# Patient Record
Sex: FEMALE | Race: BLACK OR AFRICAN AMERICAN | NOT HISPANIC OR LATINO | Employment: FULL TIME | URBAN - METROPOLITAN AREA
[De-identification: names, ages, dates, MRNs, and addresses within clinical notes are randomized per-mention and may not be internally consistent; named-entity substitution may affect disease eponyms.]

---

## 2019-08-12 ENCOUNTER — OFFICE VISIT (OUTPATIENT)
Dept: INTERNAL MEDICINE | Facility: CLINIC | Age: 39
End: 2019-08-12

## 2019-08-12 VITALS
HEART RATE: 76 BPM | WEIGHT: 137.4 LBS | RESPIRATION RATE: 18 BRPM | DIASTOLIC BLOOD PRESSURE: 64 MMHG | TEMPERATURE: 98.2 F | BODY MASS INDEX: 20.82 KG/M2 | HEIGHT: 68 IN | OXYGEN SATURATION: 99 % | SYSTOLIC BLOOD PRESSURE: 114 MMHG

## 2019-08-12 DIAGNOSIS — E03.9 HYPOTHYROIDISM, UNSPECIFIED TYPE: Primary | ICD-10-CM

## 2019-08-12 PROCEDURE — 99203 OFFICE O/P NEW LOW 30 MIN: CPT | Performed by: FAMILY MEDICINE

## 2019-08-12 RX ORDER — LEVOTHYROXINE SODIUM 50 MCG
50 TABLET ORAL DAILY
Qty: 30 TABLET | Refills: 6 | Status: SHIPPED | OUTPATIENT
Start: 2019-08-12 | End: 2019-09-03 | Stop reason: SDUPTHER

## 2019-08-12 NOTE — PROGRESS NOTES
Subjective   Sarika Zavala is a 39 y.o. female.     Chief Complaint   Patient presents with   • Thyroid Problem         History of Present Illness     Patient a new patient today's office visit.  Patient states that she has been doing over-the-counter herbal supplements and vitamins to help control her thyroid.  Patient states that she believes she has hypothyroidism, as perhaps it was checked in the past.  Patient states that she is interested in trying [place] Thyroid.  She does have signs and symptoms of hypothyroidism, which include fatigue, dry skin, and heat cold intolerance.    The following portions of the patient's history were reviewed and updated as appropriate: allergies, current medications, past family history, past medical history, past social history, past surgical history and problem list.    Review of Systems   Constitutional: Positive for fatigue. Negative for chills and fever.   HENT: Negative.  Negative for congestion, rhinorrhea, sinus pain and sore throat.    Eyes: Negative for photophobia and visual disturbance.   Respiratory: Negative.  Negative for cough, chest tightness and shortness of breath.    Cardiovascular: Negative.  Negative for chest pain and palpitations.   Gastrointestinal: Negative for diarrhea, nausea and vomiting.   Endocrine: Positive for cold intolerance.   Genitourinary: Negative for dysuria, frequency and urgency.   Skin: Negative for rash and wound.   Neurological: Negative for dizziness and syncope.   Psychiatric/Behavioral: Negative.  Negative for behavioral problems and confusion.       Objective   Physical Exam   Constitutional: She is oriented to person, place, and time. She appears well-developed and well-nourished.   HENT:   Head: Normocephalic and atraumatic.   Right Ear: External ear normal.   Left Ear: External ear normal.   Eyes: EOM are normal.   Neck: Normal range of motion. Neck supple.   Cardiovascular: Normal rate, regular rhythm and normal heart  sounds.   Pulmonary/Chest: Effort normal and breath sounds normal. No respiratory distress.   Musculoskeletal: Normal range of motion.   Lymphadenopathy:     She has no cervical adenopathy.   Neurological: She is alert and oriented to person, place, and time.   Skin: Skin is warm.   Psychiatric: She has a normal mood and affect. Her behavior is normal.   Nursing note and vitals reviewed.      Assessment/Plan   Sarika was seen today for thyroid problem.    Diagnoses and all orders for this visit:    Hypothyroidism, unspecified type  -     Thyroid Panel With TSH  -     SYNTHROID 50 MCG tablet; Take 1 tablet by mouth Daily.  -     Thyroid Panel With TSH; Future  -     I have discussed with patient to avoid [place] Thyroid at this present time.  Will check a thyroid panel.  I will start her on Synthroid 50 mcg daily.  Will titrate up as needed.  Will reevaluate patient in 1 month.          No Follow-up on file.    Dictated utilizing Dragon Voice Recognition Software

## 2019-08-13 ENCOUNTER — RESULTS ENCOUNTER (OUTPATIENT)
Dept: INTERNAL MEDICINE | Facility: CLINIC | Age: 39
End: 2019-08-13

## 2019-08-13 DIAGNOSIS — E03.9 HYPOTHYROIDISM, UNSPECIFIED TYPE: ICD-10-CM

## 2019-08-13 LAB
FT4I SERPL CALC-MCNC: 2.1 (ref 1.2–4.9)
T3RU NFR SERPL: 28 % (ref 24–39)
T4 SERPL-MCNC: 7.4 UG/DL (ref 4.5–12)
TSH SERPL DL<=0.005 MIU/L-ACNC: 0.14 UIU/ML (ref 0.45–4.5)

## 2019-08-15 NOTE — PROGRESS NOTES
Please inform the patient of the following abnormal results.  Patient's TSH is low, however she was doing natural medications to help her with her thyroid.  I recommended patient to avoid this natural medications.  She should do the Synthroid which I prescribed her in the office.  I would like to recheck her thyroid panel in 4 weeks.  This would give me a better analysis to see if the current medication she is on is enough to help her with those needs to be adjusted.

## 2019-08-27 DIAGNOSIS — Z12.39 SCREENING BREAST EXAMINATION: Primary | ICD-10-CM

## 2019-09-03 DIAGNOSIS — E03.9 HYPOTHYROIDISM, UNSPECIFIED TYPE: ICD-10-CM

## 2019-09-04 RX ORDER — LEVOTHYROXINE SODIUM 50 MCG
50 TABLET ORAL DAILY
Qty: 30 TABLET | Refills: 1 | Status: SHIPPED | OUTPATIENT
Start: 2019-09-04 | End: 2019-09-05 | Stop reason: SDUPTHER

## 2019-09-05 DIAGNOSIS — E03.9 HYPOTHYROIDISM, UNSPECIFIED TYPE: ICD-10-CM

## 2019-09-05 RX ORDER — LEVOTHYROXINE SODIUM 50 MCG
50 TABLET ORAL DAILY
Qty: 30 TABLET | Refills: 1 | Status: SHIPPED | OUTPATIENT
Start: 2019-09-05 | End: 2019-09-30 | Stop reason: SDUPTHER

## 2019-09-13 LAB
FT4I SERPL CALC-MCNC: 2.8 (ref 1.2–4.9)
T3RU NFR SERPL: 29 % (ref 24–39)
T4 SERPL-MCNC: 9.5 UG/DL (ref 4.5–12)
TSH SERPL DL<=0.005 MIU/L-ACNC: 1.14 UIU/ML (ref 0.45–4.5)

## 2019-09-20 ENCOUNTER — OFFICE VISIT (OUTPATIENT)
Dept: INTERNAL MEDICINE | Facility: CLINIC | Age: 39
End: 2019-09-20

## 2019-09-20 VITALS
BODY MASS INDEX: 21.22 KG/M2 | HEIGHT: 68 IN | TEMPERATURE: 97.4 F | WEIGHT: 140 LBS | HEART RATE: 89 BPM | OXYGEN SATURATION: 100 % | SYSTOLIC BLOOD PRESSURE: 118 MMHG | DIASTOLIC BLOOD PRESSURE: 68 MMHG

## 2019-09-20 DIAGNOSIS — E03.9 HYPOTHYROIDISM, UNSPECIFIED TYPE: Primary | ICD-10-CM

## 2019-09-20 DIAGNOSIS — Z00.00 HEALTHCARE MAINTENANCE: ICD-10-CM

## 2019-09-20 PROCEDURE — 99213 OFFICE O/P EST LOW 20 MIN: CPT | Performed by: FAMILY MEDICINE

## 2019-09-25 ENCOUNTER — TRANSCRIBE ORDERS (OUTPATIENT)
Dept: ADMINISTRATIVE | Facility: HOSPITAL | Age: 39
End: 2019-09-25

## 2019-09-25 ENCOUNTER — RESULTS ENCOUNTER (OUTPATIENT)
Dept: INTERNAL MEDICINE | Facility: CLINIC | Age: 39
End: 2019-09-25

## 2019-09-25 DIAGNOSIS — Z12.31 VISIT FOR SCREENING MAMMOGRAM: Primary | ICD-10-CM

## 2019-09-25 DIAGNOSIS — Z00.00 HEALTHCARE MAINTENANCE: ICD-10-CM

## 2019-09-27 ENCOUNTER — TELEPHONE (OUTPATIENT)
Dept: INTERNAL MEDICINE | Facility: CLINIC | Age: 39
End: 2019-09-27

## 2019-09-28 NOTE — TELEPHONE ENCOUNTER
I would like the patient to continue on the 50 mcg of Synthroid that she is currently taking.  We can discuss those changing after I get some labs on her prior to her physical next month.  At the present time I would like her on the 50 mcg of Synthroid.

## 2019-09-28 NOTE — PROGRESS NOTES
Subjective   Sarika Zavala is a 39 y.o. female.     Chief Complaint   Patient presents with   • Hypothyroidism         History of Present Illness     Patient is a past medical history for hypothyroidism.  Patient had a recent labs which indicate a normal thyroid panel with a TSH of 1.1.  Is currently taking Synthroid 50 mcg daily.  She denies any side effects of the medication.  Patient states that she is doing well on the medicine.  However she is concerned that she might be taking too much thyroid, and does not understand why she would have to continue taking thyroid medications, initially if her labs are normal at today's office visit.    The following portions of the patient's history were reviewed and updated as appropriate: allergies, current medications, past family history, past medical history, past social history, past surgical history and problem list.    Review of Systems   Constitutional: Negative.  Negative for chills and fever.   HENT: Negative for congestion, rhinorrhea, sinus pain and sore throat.    Eyes: Negative for photophobia and visual disturbance.   Respiratory: Negative for cough, chest tightness and shortness of breath.    Cardiovascular: Negative for chest pain and palpitations.   Gastrointestinal: Negative for diarrhea, nausea and vomiting.   Genitourinary: Negative for dysuria, frequency and urgency.   Skin: Negative for rash and wound.   Neurological: Negative for dizziness and syncope.   Psychiatric/Behavioral: Negative for behavioral problems and confusion.       Objective   Physical Exam   Constitutional: She is oriented to person, place, and time. She appears well-developed and well-nourished.   HENT:   Head: Normocephalic and atraumatic.   Eyes: EOM are normal.   Neck: Normal range of motion. Neck supple.   Cardiovascular: Normal rate, regular rhythm and normal heart sounds.   Pulmonary/Chest: Effort normal and breath sounds normal. No respiratory distress.   Neurological: She  is alert and oriented to person, place, and time.   Psychiatric: She has a normal mood and affect. Her behavior is normal.   Nursing note and vitals reviewed.      Assessment/Plan   Sarika was seen today for hypothyroidism.    Diagnoses and all orders for this visit:    Hypothyroidism, unspecified type  -     T3, Reverse; Future  -     Thyroid Peroxidase Antibody; Future  -     Thyroid Stimulating Immunoglobulin; Future  -     Have spent extensive counseling at today's office visit and explained to the patient that the disease of hypothyroidism needs to have treatment for life.  It appears that her labs are currently normal for her thyroid at the present time, but she has been taking Synthroid 50 mcg daily.  I have counseled her that we will continue to keep checking her thyroid panel, and if there is any variations, we can adjust the dose accordingly.  However if she stopped taking her Synthroid, TSH will start to rise again.    Healthcare maintenance  -     CBC & Differential; Future  -     Comprehensive Metabolic Panel; Future  -     Hemoglobin A1c; Future  -     Thyroid Panel With TSH; Future  -     Lipid Panel With LDL / HDL Ratio; Future  -     DHEA-sulfate; Future  -     Ferritin; Future  -     Follicle stimulating hormone; Future  -     Luteinizing hormone; Future  -     Prolactin; Future  -     Testosterone Free Direct; Future          No Follow-up on file.    Dictated utilizing Dragon Voice Recognition Software

## 2019-09-30 ENCOUNTER — RESULTS ENCOUNTER (OUTPATIENT)
Dept: INTERNAL MEDICINE | Facility: CLINIC | Age: 39
End: 2019-09-30

## 2019-09-30 DIAGNOSIS — E03.9 HYPOTHYROIDISM, UNSPECIFIED TYPE: ICD-10-CM

## 2019-09-30 DIAGNOSIS — Z00.00 HEALTHCARE MAINTENANCE: ICD-10-CM

## 2019-09-30 RX ORDER — LEVOTHYROXINE SODIUM 50 MCG
50 TABLET ORAL DAILY
Qty: 30 TABLET | Refills: 1 | Status: SHIPPED | OUTPATIENT
Start: 2019-09-30 | End: 2019-10-23

## 2019-10-12 LAB
DHEA-S SERPL-MCNC: 117.8 UG/DL (ref 57.3–279.2)
FERRITIN SERPL-MCNC: 29.9 NG/ML (ref 13–150)
FSH SERPL-ACNC: 2.5 MIU/ML
LH SERPL-ACNC: 5.8 MIU/ML
PROLACTIN SERPL-MCNC: 19.7 NG/ML (ref 4.8–23.3)
TESTOST FREE SERPL-MCNC: 1 PG/ML (ref 0–4.2)

## 2019-10-13 LAB
ALBUMIN SERPL-MCNC: 4.6 G/DL (ref 3.5–5.2)
ALBUMIN/GLOB SERPL: 1.4 G/DL
ALP SERPL-CCNC: 51 U/L (ref 39–117)
ALT SERPL-CCNC: 12 U/L (ref 1–33)
AST SERPL-CCNC: 18 U/L (ref 1–32)
BASOPHILS # BLD AUTO: 0.02 10*3/MM3 (ref 0–0.2)
BASOPHILS NFR BLD AUTO: 0.7 % (ref 0–1.5)
BILIRUB SERPL-MCNC: 0.4 MG/DL (ref 0.2–1.2)
BUN SERPL-MCNC: 6 MG/DL (ref 6–20)
BUN/CREAT SERPL: 7 (ref 7–25)
CALCIUM SERPL-MCNC: 9.5 MG/DL (ref 8.6–10.5)
CHLORIDE SERPL-SCNC: 104 MMOL/L (ref 98–107)
CHOLEST SERPL-MCNC: 178 MG/DL (ref 0–200)
CO2 SERPL-SCNC: 24.9 MMOL/L (ref 22–29)
CREAT SERPL-MCNC: 0.86 MG/DL (ref 0.57–1)
EOSINOPHIL # BLD AUTO: 0.04 10*3/MM3 (ref 0–0.4)
EOSINOPHIL NFR BLD AUTO: 1.3 % (ref 0.3–6.2)
ERYTHROCYTE [DISTWIDTH] IN BLOOD BY AUTOMATED COUNT: 12.9 % (ref 12.3–15.4)
FT4I SERPL CALC-MCNC: 2.9 (ref 1.2–4.9)
GLOBULIN SER CALC-MCNC: 3.3 GM/DL
GLUCOSE SERPL-MCNC: 83 MG/DL (ref 65–99)
HBA1C MFR BLD: 5.2 % (ref 4.8–5.6)
HCT VFR BLD AUTO: 38.3 % (ref 34–46.6)
HDLC SERPL-MCNC: 56 MG/DL (ref 40–60)
HGB BLD-MCNC: 12.4 G/DL (ref 12–15.9)
IMM GRANULOCYTES # BLD AUTO: 0 10*3/MM3 (ref 0–0.05)
IMM GRANULOCYTES NFR BLD AUTO: 0 % (ref 0–0.5)
LDLC SERPL CALC-MCNC: 108 MG/DL (ref 0–100)
LDLC/HDLC SERPL: 1.93 {RATIO}
LYMPHOCYTES # BLD AUTO: 1.02 10*3/MM3 (ref 0.7–3.1)
LYMPHOCYTES NFR BLD AUTO: 34 % (ref 19.6–45.3)
MCH RBC QN AUTO: 29.2 PG (ref 26.6–33)
MCHC RBC AUTO-ENTMCNC: 32.4 G/DL (ref 31.5–35.7)
MCV RBC AUTO: 90.3 FL (ref 79–97)
MONOCYTES # BLD AUTO: 0.34 10*3/MM3 (ref 0.1–0.9)
MONOCYTES NFR BLD AUTO: 11.3 % (ref 5–12)
NEUTROPHILS # BLD AUTO: 1.58 10*3/MM3 (ref 1.7–7)
NEUTROPHILS NFR BLD AUTO: 52.7 % (ref 42.7–76)
NRBC BLD AUTO-RTO: 0 /100 WBC (ref 0–0.2)
PLATELET # BLD AUTO: 200 10*3/MM3 (ref 140–450)
POTASSIUM SERPL-SCNC: 4.5 MMOL/L (ref 3.5–5.2)
PROT SERPL-MCNC: 7.9 G/DL (ref 6–8.5)
RBC # BLD AUTO: 4.24 10*6/MM3 (ref 3.77–5.28)
SODIUM SERPL-SCNC: 140 MMOL/L (ref 136–145)
T3REVERSE SERPL-MCNC: 23.3 NG/DL (ref 9.2–24.1)
T3RU NFR SERPL: 29 % (ref 24–39)
T4 SERPL-MCNC: 10.1 UG/DL (ref 4.5–12)
THYROPEROXIDASE AB SERPL-ACNC: 22 IU/ML (ref 0–34)
TRIGL SERPL-MCNC: 70 MG/DL (ref 0–150)
TSH SERPL DL<=0.005 MIU/L-ACNC: 0.74 UIU/ML (ref 0.45–4.5)
TSI SER-ACNC: <0.1 IU/L (ref 0–0.55)
VLDLC SERPL CALC-MCNC: 14 MG/DL
WBC # BLD AUTO: 3 10*3/MM3 (ref 3.4–10.8)

## 2019-10-16 NOTE — PROGRESS NOTES
The labs were reviewed. Please inform patient that labs were normal.  Labs look normal, will need to discuss findings with the patient at next office visit.

## 2019-10-23 ENCOUNTER — OFFICE VISIT (OUTPATIENT)
Dept: INTERNAL MEDICINE | Facility: CLINIC | Age: 39
End: 2019-10-23

## 2019-10-23 VITALS
WEIGHT: 139 LBS | HEIGHT: 68 IN | OXYGEN SATURATION: 100 % | HEART RATE: 99 BPM | RESPIRATION RATE: 15 BRPM | BODY MASS INDEX: 21.07 KG/M2 | SYSTOLIC BLOOD PRESSURE: 112 MMHG | TEMPERATURE: 97 F | DIASTOLIC BLOOD PRESSURE: 71 MMHG

## 2019-10-23 DIAGNOSIS — Z13.220 SCREENING FOR LIPID DISORDERS: ICD-10-CM

## 2019-10-23 DIAGNOSIS — E03.9 HYPOTHYROIDISM, UNSPECIFIED TYPE: ICD-10-CM

## 2019-10-23 DIAGNOSIS — Z00.00 WELL WOMAN EXAM (NO GYNECOLOGICAL EXAM): Primary | ICD-10-CM

## 2019-10-23 DIAGNOSIS — Z13.1 SCREENING FOR DIABETES MELLITUS: ICD-10-CM

## 2019-10-23 DIAGNOSIS — Z13.29 SCREENING FOR THYROID DISORDER: ICD-10-CM

## 2019-10-23 PROCEDURE — 99213 OFFICE O/P EST LOW 20 MIN: CPT | Performed by: FAMILY MEDICINE

## 2019-10-23 PROCEDURE — 99396 PREV VISIT EST AGE 40-64: CPT | Performed by: FAMILY MEDICINE

## 2019-10-23 RX ORDER — LEVOTHYROXINE SODIUM 50 UG/1
50 CAPSULE ORAL DAILY
Qty: 30 CAPSULE | Refills: 11 | Status: SHIPPED | OUTPATIENT
Start: 2019-10-23 | End: 2019-10-23 | Stop reason: SDUPTHER

## 2019-10-23 RX ORDER — LEVOTHYROXINE SODIUM 50 UG/1
50 CAPSULE ORAL DAILY
Qty: 30 CAPSULE | Refills: 11 | Status: SHIPPED | OUTPATIENT
Start: 2019-10-23 | End: 2020-05-22 | Stop reason: SDUPTHER

## 2019-10-23 NOTE — PROGRESS NOTES
Subjective   Sarika Zavala is a 39 y.o. female and is here for a comprehensive physical exam. The patient reports no problems.    Pt is due for a mammogram and scheduled next month.  Patient does need a Pap smear.    Patient at today's office visit has a history of hypothyroidism.  Patient has been on Synthroid 50 mcg daily.  Patient is concerned about this medication, she believes she is having side effects of the medication due to having headaches and feeling lethargic with this.  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  She does note that the medication does have some of these ingredients in it.  Patient is hoping to switch the medication to something that does not have this in it.    Patient is also here for her physical.  On looking at all recent labs that were done including a lipid panel, hemoglobin A1c, CBC, CMP, and thyroid panel, it appears her LDL is just mildly elevated which she can monitor with diet and exercise.  Overall patient's blood work appears to look very well.      Social History:   Social History     Socioeconomic History   • Marital status: Single     Spouse name: Not on file   • Number of children: Not on file   • Years of education: Not on file   • Highest education level: Not on file   Social Needs   • Financial resource strain: Patient refused   • Food insecurity:     Worry: Patient refused     Inability: Patient refused   • Transportation needs:     Medical: Patient refused     Non-medical: Patient refused   Tobacco Use   • Smoking status: Never Smoker   • Smokeless tobacco: Former User   Substance and Sexual Activity   • Alcohol use: Yes     Frequency: Monthly or less     Drinks per session: 1 or 2     Binge frequency: Never     Comment: occasion   • Drug use: No   • Sexual activity: Not Currently     Partners: Male   Lifestyle   • Physical activity:     Days per week: 5 days     Minutes per session: 60 min   • Stress: Patient  refused   Relationships   • Social connections:     Talks on phone: Patient refused     Gets together: Patient refused     Attends Muslim service: Patient refused     Active member of club or organization: Patient refused     Attends meetings of clubs or organizations: Patient refused     Relationship status: Patient refused       Family History:   Family History   Problem Relation Age of Onset   • Breast cancer Mother    • No Known Problems Father    • Hypertension Paternal Grandmother        Past Medical History:   Past Medical History:   Diagnosis Date   • Allergic    • Benign breast cyst in female, right        The following portions of the patient's history were reviewed and updated as appropriate: allergies, current medications, past family history, past medical history, past social history, past surgical history and problem list.    Review of Systems    Review of Systems   Constitutional: Negative for chills and fever.   HENT: Negative for congestion, rhinorrhea, sinus pain and sore throat.    Eyes: Negative for photophobia and visual disturbance.   Respiratory: Negative for cough, chest tightness and shortness of breath.    Cardiovascular: Negative for chest pain and palpitations.   Gastrointestinal: Negative for diarrhea, nausea and vomiting.   Genitourinary: Negative for dysuria, frequency and urgency.   Skin: Negative for rash and wound.   Neurological: Negative for dizziness and syncope.   Psychiatric/Behavioral: Negative for behavioral problems and confusion.       Objective   Physical Exam   Constitutional: She is oriented to person, place, and time. She appears well-developed and well-nourished.   HENT:   Head: Normocephalic and atraumatic.   Right Ear: External ear normal.   Left Ear: External ear normal.   Mouth/Throat: Oropharynx is clear and moist.   Eyes: EOM are normal.   Neck: Normal range of motion. Neck supple.   Cardiovascular: Normal rate, regular rhythm and normal heart sounds.    Pulmonary/Chest: Effort normal and breath sounds normal. No respiratory distress.   Abdominal: Soft. There is no tenderness. There is no guarding.   Musculoskeletal: Normal range of motion.   Lymphadenopathy:     She has no cervical adenopathy.   Neurological: She is alert and oriented to person, place, and time.   Skin: Skin is warm.   Psychiatric: She has a normal mood and affect. Her behavior is normal.   Nursing note and vitals reviewed.      Medications:   Current Outpatient Medications:   •  Biotin w/ Vitamins C & E (HAIR SKIN & NAILS GUMMIES PO), Take  by mouth. 3 gummies daily, Disp: , Rfl:   •  Calcium Citrate-Vitamin D (CALCIUM + D PO), Take 2 capsules by mouth Daily., Disp: , Rfl:   •  Chlorpheniramine Maleate (ALLERGY PO), Take  by mouth. rhinallergy homeopathic medicine, Disp: , Rfl:   •  Pseudoephedrine-Acetaminophen (SINUS PO), Take  by mouth. sinusalia, Disp: , Rfl:   •  QUERCETIN PO, Take 2 capsules by mouth Daily., Disp: , Rfl:   •  Specialty Vitamins Products (THYMUS PO), Take 2 capsules by mouth 2 (Two) Times a Day., Disp: , Rfl:   •  TIROSINT 50 MCG capsule, Take 1 capsule by mouth Daily., Disp: 30 capsule, Rfl: 11       Assessment/Plan   Healthy female exam.      1. Healthcare Maintenance:  2. Patient Counseling:  --Nutrition: Stressed importance of moderation in sodium/caffeine intake, saturated fat and cholesterol, caloric balance, sufficient intake of fresh fruits, vegetables, fiber, calcium and vit D  --Exercise: Recommended 30 minutes of exercise daily.  --Immunizations reviewed.      Diagnoses and all orders for this visit:    Well woman exam (no gynecological exam)  -     Ambulatory Referral to Obstetrics / Gynecology    Hypothyroidism, unspecified type  -     We will stop the Synthroid.  We will switch to Tirosint.  Recheck thyroid panel in 3 months.  -     Discontinue: TIROSINT 50 MCG capsule; Take 1 capsule by mouth Daily.  -     TIROSINT 50 MCG capsule; Take 1 capsule by mouth  Daily.    Screening for diabetes mellitus    Screening for thyroid disorder    Screening for lipid disorders        No Follow-up on file.             Dictated utilizing Dragon Voice Recognition Software

## 2019-10-24 ENCOUNTER — RESULTS ENCOUNTER (OUTPATIENT)
Dept: INTERNAL MEDICINE | Facility: CLINIC | Age: 39
End: 2019-10-24

## 2019-10-24 DIAGNOSIS — E03.9 HYPOTHYROIDISM, UNSPECIFIED TYPE: ICD-10-CM

## 2019-11-04 ENCOUNTER — TELEPHONE (OUTPATIENT)
Dept: OBSTETRICS AND GYNECOLOGY | Age: 39
End: 2019-11-04

## 2019-11-04 NOTE — TELEPHONE ENCOUNTER
S/w pt 11/4/19 she will check her schedule and callback to possibly sched NGYN appt w Dr Villafana.    Referral received for new gyn pt needing to est care for an annual exam.    Referred by:  Smooth Parham    Requesting Dr Villafana.

## 2019-11-05 ENCOUNTER — HOSPITAL ENCOUNTER (OUTPATIENT)
Dept: MAMMOGRAPHY | Facility: HOSPITAL | Age: 39
Discharge: HOME | End: 2019-11-05
Admitting: FAMILY MEDICINE

## 2019-11-05 DIAGNOSIS — Z12.31 VISIT FOR SCREENING MAMMOGRAM: ICD-10-CM

## 2019-11-05 PROCEDURE — 77063 BREAST TOMOSYNTHESIS BI: CPT

## 2019-11-05 PROCEDURE — 77067 SCR MAMMO BI INCL CAD: CPT

## 2019-11-07 NOTE — PROGRESS NOTES
There is no evidence for malignancy or significant change in either breast. Routine followup mammography is recommended.

## 2020-01-20 ENCOUNTER — OFFICE VISIT (OUTPATIENT)
Dept: INTERNAL MEDICINE | Facility: CLINIC | Age: 40
End: 2020-01-20

## 2020-01-20 VITALS
HEIGHT: 68 IN | SYSTOLIC BLOOD PRESSURE: 123 MMHG | BODY MASS INDEX: 21.52 KG/M2 | OXYGEN SATURATION: 99 % | WEIGHT: 142 LBS | RESPIRATION RATE: 16 BRPM | DIASTOLIC BLOOD PRESSURE: 61 MMHG | HEART RATE: 82 BPM

## 2020-01-20 DIAGNOSIS — E03.9 HYPOTHYROIDISM, UNSPECIFIED TYPE: Primary | ICD-10-CM

## 2020-01-20 PROCEDURE — 99213 OFFICE O/P EST LOW 20 MIN: CPT | Performed by: FAMILY MEDICINE

## 2020-01-20 NOTE — PROGRESS NOTES
Subjective   Sarika Zavala is a 39 y.o. female.     Chief Complaint   Patient presents with   • Follow-up         History of Present Illness     Patient is here to follow up on her hypothyroidism. She is currently taking tirosint 50 mcg daily. Patient denies any side effects of the medication. Patient notes that overall, she feels good on the medication. Thyroid panel done last week is within normal limits.     The following portions of the patient's history were reviewed and updated as appropriate: allergies, current medications, past family history, past medical history, past social history, past surgical history and problem list.    Review of Systems   Constitutional: Negative.    HENT: Negative.    Respiratory: Negative.    Hematological: Negative.    Psychiatric/Behavioral: Negative.        Objective   Physical Exam   Constitutional: She is oriented to person, place, and time. She appears well-developed and well-nourished.   HENT:   Head: Normocephalic and atraumatic.   Eyes: EOM are normal.   Neck: Normal range of motion. Neck supple.   Cardiovascular: Normal rate, regular rhythm and normal heart sounds.   Pulmonary/Chest: Effort normal and breath sounds normal. No respiratory distress.   Neurological: She is alert and oriented to person, place, and time.   Psychiatric: She has a normal mood and affect. Her behavior is normal.   Nursing note and vitals reviewed.      Vitals:    01/20/20 1335   BP: 123/61   Pulse: 82   Resp: 16   SpO2: 99%     Body mass index is 21.77 kg/m².      Assessment/Plan   Sarika was seen today for follow-up.    Diagnoses and all orders for this visit:    Hypothyroidism, unspecified type  -     Thyroid Panel With TSH; Future  -     Continue tirosint 50mcg daily. Patient currently stable.           No follow-ups on file.    Dictated utilizing Dragon Voice Recognition Software

## 2020-01-21 ENCOUNTER — RESULTS ENCOUNTER (OUTPATIENT)
Dept: INTERNAL MEDICINE | Facility: CLINIC | Age: 40
End: 2020-01-21

## 2020-01-21 DIAGNOSIS — E03.9 HYPOTHYROIDISM, UNSPECIFIED TYPE: ICD-10-CM

## 2020-01-31 ENCOUNTER — OFFICE VISIT (OUTPATIENT)
Dept: OBSTETRICS AND GYNECOLOGY | Age: 40
End: 2020-01-31

## 2020-01-31 VITALS
SYSTOLIC BLOOD PRESSURE: 128 MMHG | WEIGHT: 143 LBS | DIASTOLIC BLOOD PRESSURE: 82 MMHG | HEIGHT: 67 IN | BODY MASS INDEX: 22.44 KG/M2

## 2020-01-31 DIAGNOSIS — Z13.89 SCREENING FOR HEMATURIA OR PROTEINURIA: ICD-10-CM

## 2020-01-31 DIAGNOSIS — Z12.4 SCREENING FOR MALIGNANT NEOPLASM OF CERVIX: Primary | ICD-10-CM

## 2020-01-31 LAB
BILIRUB BLD-MCNC: NEGATIVE MG/DL
CLARITY, POC: CLEAR
COLOR UR: YELLOW
GLUCOSE UR STRIP-MCNC: NEGATIVE MG/DL
KETONES UR QL: NEGATIVE
LEUKOCYTE EST, POC: NEGATIVE
NITRITE UR-MCNC: NEGATIVE MG/ML
PH UR: 7.5 [PH] (ref 5–8)
PROT UR STRIP-MCNC: NEGATIVE MG/DL
RBC # UR STRIP: NEGATIVE /UL
SP GR UR: 1.01 (ref 1–1.03)
UROBILINOGEN UR QL: NORMAL

## 2020-01-31 PROCEDURE — 99386 PREV VISIT NEW AGE 40-64: CPT | Performed by: OBSTETRICS & GYNECOLOGY

## 2020-01-31 PROCEDURE — 81002 URINALYSIS NONAUTO W/O SCOPE: CPT | Performed by: OBSTETRICS & GYNECOLOGY

## 2020-01-31 RX ORDER — CETIRIZINE HYDROCHLORIDE 5 MG/1
5 TABLET ORAL DAILY
COMMUNITY

## 2020-01-31 RX ORDER — TRIAMCINOLONE ACETONIDE 55 UG/1
2 SPRAY, METERED NASAL DAILY
COMMUNITY

## 2020-01-31 NOTE — PROGRESS NOTES
Routine Annual Visit    2020    Patient: Sarika Zavala          MR#:[id number]      Chief Complaint   Patient presents with   • Gynecologic Exam     New pt. referred by dr parham. last pap 10/2018 per pt. c/o spotting before her menses begins every month        History of Present Illness    39 y.o. female  who presents for annual exam.   Just has hypothyroidism  Regular menses, no abnormality. Not very heavy.  Not sexually active, no plans to become so currently      Health Maintenance  Gardasil unsure,possibly  Last pap: 2018, history abnormal: none  Mammogram: end of , normal, dense breast, history abnormal: no  Colonoscopy not yet, repeat due NA  Family history of Breast, ovarian, uterine, colon, pancreatic cancer: yes - mother had breast cancer s/p lumpx   PCP Dr. Parham    Current contraception: none    Patient's last menstrual period was 2019 (exact date).  Obstetric History:  OB History        0    Para   0    Term   0       0    AB   0    Living   0       SAB   0    TAB   0    Ectopic   0    Molar   0    Multiple   0    Live Births   0               Menstrual History:     Patient's last menstrual period was 2019 (exact date).       ________________________________________  Patient Active Problem List   Diagnosis   • Hypothyroidism       Past Medical History:   Diagnosis Date   • Allergic    • Benign breast cyst in female, right    • Disease of thyroid gland        Family History   Problem Relation Age of Onset   • Breast cancer Mother    • No Known Problems Father    • Hypertension Paternal Grandmother        History reviewed. No pertinent surgical history.    Social History     Tobacco Use   Smoking Status Never Smoker   Smokeless Tobacco Former User       has a current medication list which includes the following prescription(s): biotin w/ vitamins c & e, calcium citrate-vitamin d, cetirizine, chlorpheniramine maleate, quercetin, tirosint, triamcinolone  "acetonide, pseudoephedrine-acetaminophen, and specialty vitamins products.  ________________________________________      The following portions of the patient's history were reviewed and updated as appropriate: allergies, current medications, past family history, past medical history, past social history, past surgical history and problem list.    Review of Systems   Constitutional: Negative for fever and unexpected weight change.   Respiratory: Negative for shortness of breath.    Cardiovascular: Negative for chest pain.   Gastrointestinal: Negative for abdominal pain, constipation and diarrhea.   Genitourinary: Negative for frequency and urgency.   Hematological: Negative for adenopathy.   Psychiatric/Behavioral: Negative for dysphoric mood.       Objective   Physical Exam    /82   Ht 170.2 cm (67\")   Wt 64.9 kg (143 lb)   LMP 12/31/2019 (Exact Date)   Breastfeeding No   BMI 22.40 kg/m²    BP Readings from Last 3 Encounters:   01/31/20 128/82   01/20/20 123/61   10/23/19 112/71      Wt Readings from Last 3 Encounters:   01/31/20 64.9 kg (143 lb)   01/20/20 64.4 kg (142 lb)   10/23/19 63 kg (139 lb)         BMI: Body mass index is 22.4 kg/m².       General:   alert, appears stated age and cooperative   Heart:: regular rate and rhythm, S1, S2 normal, no murmur, click, rub or gallop   Lungs: normal respiratory effort and auscultation   Abdomen: soft, non-tender, without masses or organomegaly   Breast: inspection negative, no nipple discharge or bleeding, no masses or nodularity palpable and dense breast tissue   Urethra and bladder: urethral meatus normal; bladder nontender to palpation;   Vulva: normal, [place]th[place]'s, Urethra, [place]'s normal   Vagina: normal mucosa, normal discharge   Cervix: her cervix is very anterior   Uterus: sharply retroverted   Adnexa: normal adnexa and no mass, fullness, tenderness       Assessment:    normal annual exam     Plan:    Plan     []  Mammogram request made- UTD  [x]  " PAP done  []  Labs:   []  GC/Chl/TV  []  DEXA scan   []  Referral for colonoscopy:       Counseling  [x]  Nutrition  [x]  Physical activity/regular exercise   [x]  Healthy weight  []  Injury prevention  []  Smoking cessation  []  Substance misuse/abuse  [x]  Sexual behavior  []  STD prevention  [x]  Contraception not currently sexually active, declines  []  Dental health  []  Mental health  [x]  Immunization  [x]  Encouraged SBE        Angelita Villafana MD  01/31/2020  2:27 PM;l

## 2020-02-04 ENCOUNTER — TELEPHONE (OUTPATIENT)
Dept: OBSTETRICS AND GYNECOLOGY | Age: 40
End: 2020-02-04

## 2020-02-04 LAB
CYTOLOGIST CVX/VAG CYTO: NORMAL
CYTOLOGY CVX/VAG DOC CYTO: NORMAL
CYTOLOGY CVX/VAG DOC THIN PREP: NORMAL
DX ICD CODE: NORMAL
HIV 1 & 2 AB SER-IMP: NORMAL
HPV I/H RISK 4 DNA CVX QL PROBE+SIG AMP: NEGATIVE
OTHER STN SPEC: NORMAL
STAT OF ADQ CVX/VAG CYTO-IMP: NORMAL

## 2020-02-04 NOTE — TELEPHONE ENCOUNTER
----- Message from Angelita Villafana MD sent at 2/4/2020  1:19 PM EST -----  Please notify that her Pap was normal and negative HPV    Angelita Villafana MD  2/4/2020  1:19 PM

## 2020-05-21 ENCOUNTER — TELEPHONE (OUTPATIENT)
Dept: INTERNAL MEDICINE | Facility: CLINIC | Age: 40
End: 2020-05-21

## 2020-05-21 DIAGNOSIS — E03.9 HYPOTHYROIDISM, UNSPECIFIED TYPE: ICD-10-CM

## 2020-05-21 NOTE — TELEPHONE ENCOUNTER
"PATIENT CALLED STATING THAT SOMEONE FROM THE PHARMACY AT [facility] IN [place] ON [facility] CALLED HER TO INFORM HER THAT HER INSURANCE ([employer]) IS WANTING TO SWITCH HER FROM THE TIROSINT 50 MCG CAPSULE TO A GENERIC FORM OF THE MEDICATION. THE PATIENT STATED THAT SHE DOES NOT BELIEVE THAT A GENERIC FORM OF THIS MEDICATION EXISTS, BUT EVEN IF IT DID, SHE STATED THAT SHE CANNOT TAKE IT BECAUSE SHE HAS AN ALLERGY TO THE \"FILLERS\" OR \"ADDITIVES\" THAT THE MEDICATION CONTAINS. THE PATIENT STATED THAT THE TIROSINT 50 MCG CAPSULE IS JUST THE \"T4 MEDICINE ALONE\" WITH GLYCERIN AND WATER, BUT OTHER FORMS OF THIS MEDICATION CONTAIN FILLERS SUCH AS LACTOSE AND GLUTEN AND SHE FEELS LIKE SHE HAS A REACTION TO THESE ADDITIVES.     THE PATIENT STATED THAT SHE HAS TRIED TAKING SYNTHROID IN THE PAST AND SHE FELT LIKE SHE WAS HAVING AN ALLERGIC REACTION TO IT. THE PATIENT STATED THAT SHE FELT LIKE SHE WAS HAVING SHORTNESS OF BREATH AND HER BODY WAS NOT RESPONDING WELL TO IT. THE PATIENT STATED THAT SHE DOES HAVE AN INTOLERANCE TO GLUTEN AND DAIRY, SO THESE ADDITIVES DEFINITELY CONTRIBUTED TO THE SIDE EFFECTS THAT SHE WAS FEELING.    THE PATIENT STATED THAT HER INSURANCE WILL NOT COVER HER PRESCRIPTION FOR THE TIROSINT 50 MCG CAPSULE UNTIL THEY RECEIVE SOMETHING FROM DR. SMITH DETAILING WHY SHE NEEDS TO TAKE THIS SPECIFIC MEDICATION VERSUS A GENERIC FORM OF IT.    THE PATIENT STATED THAT THIS MEDICATION IS EXPENSIVE, EVEN WITH INSURANCE, BUT DR. LORENZO WORKS WITH HER AND TRIES TO GET COUPONS TO HELP MAKE THE MEDICATION AFFORDABLE FOR HER. HOWEVER, THE PATIENT STATED THAT WITHOUT HER INSURANCE COVERING THIS MEDICATION, SHE WOULD NOT BE ABLE TO AFFORD IT.    THE PATIENT REQUESTED FOR DR. SMITH TO CONTACT HER INSURANCE COMPANY ([employer]) TO PROVIDE THEM WITH THE INFORMATION NEEDED FOR THEM APPLY COVERAGE TO HER PRESCRIPTION FOR THE TIROSINT 50 MCG CAPSULE.    THE PATIENT STATED THAT SHE TAKES 1 CAPSULE A DAY, AND " SHE CURRENTLY HAS 6 OR 7 CAPSULES LEFT OF THIS MEDICATION.    I CONFIRMED THE CORRECT PHARMACY WITH THE PATIENT TO BE [facility] IN [facility].    IF THERE ARE ANY QUESTIONS OR CONCERNS PLEASE CALL THE PATIENT [phone] OR THE PHARMACY AT [facility] [phone]. THE PATIENT STATED THAT A DETAILED MESSAGE CAN BE LEFT ON HER VOICEMAIL IF SHE IS UNABLE TO ANSWER WHEN CALLED.      ADDITIONALLY, THE PATIENT REQUESTED FOR DR. SMITH TO PRESCRIBE HER T3 MEDICATION. THE PATIENT STATED THAT SOME PEOPLE HAVE TROUBLE CONVERTING T4 INTO T3, SO TAKING A PRESCRIPTION FOR THE T3 HELPS TO SUPPLEMENT THE GAP OF WHAT IS NOT CONVERTED. ALSO, THE PATIENT STATED THAT SOME PEOPLE HAVE HIGH LEVELS OF REVERSE T3 WHEN TAKING A T4 MEDICATION SO, AGAIN, IT WOULD HELP FILL IN THE GAP OF THE T3 LEVEL THAT IS DEPLETED.    THE PATIENT STATED THAT SHE WOULD LIKE TO BE PRESCRIBED WHATEVER MEDICATION DR. SMITH RECOMMENDS, BUT SHE MENTIONED CYTOMEL AS A POSSIBLE MEDICATION TO BE PRESCRIBED.    I CONFIRMED THE CORRECT PHARMACY WITH THE PATIENT TO BE [facility] IN [facility].    IF THERE ARE ANY QUESTIONS OR CONCERNS PLEASE CALL THE PATIENT [phone] OR THE PHARMACY AT [facility] [phone]. THE PATIENT STATED THAT A DETAILED MESSAGE CAN BE LEFT ON HER VOICEMAIL IF SHE IS UNABLE TO ANSWER WHEN CALLED.

## 2020-05-22 RX ORDER — LEVOTHYROXINE SODIUM 50 UG/1
50 CAPSULE ORAL DAILY
Qty: 30 CAPSULE | Refills: 11 | Status: SHIPPED | OUTPATIENT
Start: 2020-05-22 | End: 2021-04-14

## 2020-05-22 NOTE — TELEPHONE ENCOUNTER
We can write her a letter telling her she needs to use Tirosint only.  In the past we have tried to give her only this medication.  She cannot tolerate the generic nor the Synthroid.  I do not know if this needs a prior authorization or if it is a coupon card that she needs.  I understand that the cash pay for this medication has gotten very cheap with her pain approximately 50-70 bucks a month, compared to what it used to be.    As for T3, I do not prescribe T3.  If patient is concerned about this, I think she will probably benefit from seeing endocrinology and they can have further evaluation of her.  If that is what she would like to do let me know, I can put a referral in.

## 2020-05-28 NOTE — TELEPHONE ENCOUNTER
I received an alternative request for this pt to change to Levothyroxine but she has allergies to fillers in generic medications. I attempted to initiate a PA but I received a message stating it was too early to fill and it will not let me do a PA.

## 2020-09-24 ENCOUNTER — TRANSCRIBE ORDERS (OUTPATIENT)
Dept: ADMINISTRATIVE | Facility: HOSPITAL | Age: 40
End: 2020-09-24

## 2020-09-24 DIAGNOSIS — Z12.31 VISIT FOR SCREENING MAMMOGRAM: Primary | ICD-10-CM

## 2020-10-16 ENCOUNTER — LAB (OUTPATIENT)
Dept: LAB | Facility: HOSPITAL | Age: 40
End: 2020-10-16

## 2020-10-16 ENCOUNTER — TELEPHONE (OUTPATIENT)
Dept: INTERNAL MEDICINE | Facility: CLINIC | Age: 40
End: 2020-10-16

## 2020-10-16 DIAGNOSIS — Z13.1 SCREENING FOR DIABETES MELLITUS: ICD-10-CM

## 2020-10-16 DIAGNOSIS — Z00.00 WELL WOMAN EXAM (NO GYNECOLOGICAL EXAM): ICD-10-CM

## 2020-10-16 DIAGNOSIS — Z13.29 SCREENING FOR THYROID DISORDER: ICD-10-CM

## 2020-10-16 DIAGNOSIS — Z13.220 SCREENING FOR LIPID DISORDERS: Primary | ICD-10-CM

## 2020-10-16 DIAGNOSIS — Z13.220 SCREENING FOR LIPID DISORDERS: ICD-10-CM

## 2020-10-16 LAB
BASOPHILS # BLD AUTO: 0.03 10*3/MM3 (ref 0–0.2)
BASOPHILS NFR BLD AUTO: 0.9 % (ref 0–1.5)
DEPRECATED RDW RBC AUTO: 40 FL (ref 37–54)
EOSINOPHIL # BLD AUTO: 0.18 10*3/MM3 (ref 0–0.4)
EOSINOPHIL NFR BLD AUTO: 5.2 % (ref 0.3–6.2)
ERYTHROCYTE [DISTWIDTH] IN BLOOD BY AUTOMATED COUNT: 12.4 % (ref 12.3–15.4)
HBA1C MFR BLD: 5.5 % (ref 4.8–5.6)
HCT VFR BLD AUTO: 39.3 % (ref 34–46.6)
HGB BLD-MCNC: 13.2 G/DL (ref 12–15.9)
IMM GRANULOCYTES # BLD AUTO: 0.01 10*3/MM3 (ref 0–0.05)
IMM GRANULOCYTES NFR BLD AUTO: 0.3 % (ref 0–0.5)
LYMPHOCYTES # BLD AUTO: 1.11 10*3/MM3 (ref 0.7–3.1)
LYMPHOCYTES NFR BLD AUTO: 32 % (ref 19.6–45.3)
MCH RBC QN AUTO: 30 PG (ref 26.6–33)
MCHC RBC AUTO-ENTMCNC: 33.6 G/DL (ref 31.5–35.7)
MCV RBC AUTO: 89.3 FL (ref 79–97)
MONOCYTES # BLD AUTO: 0.41 10*3/MM3 (ref 0.1–0.9)
MONOCYTES NFR BLD AUTO: 11.8 % (ref 5–12)
NEUTROPHILS NFR BLD AUTO: 1.73 10*3/MM3 (ref 1.7–7)
NEUTROPHILS NFR BLD AUTO: 49.8 % (ref 42.7–76)
NRBC BLD AUTO-RTO: 0 /100 WBC (ref 0–0.2)
PLATELET # BLD AUTO: 223 10*3/MM3 (ref 140–450)
PMV BLD AUTO: 10.4 FL (ref 6–12)
RBC # BLD AUTO: 4.4 10*6/MM3 (ref 3.77–5.28)
WBC # BLD AUTO: 3.47 10*3/MM3 (ref 3.4–10.8)

## 2020-10-16 PROCEDURE — 83036 HEMOGLOBIN GLYCOSYLATED A1C: CPT | Performed by: FAMILY MEDICINE

## 2020-10-16 PROCEDURE — 80061 LIPID PANEL: CPT | Performed by: FAMILY MEDICINE

## 2020-10-16 PROCEDURE — 80053 COMPREHEN METABOLIC PANEL: CPT | Performed by: FAMILY MEDICINE

## 2020-10-16 PROCEDURE — 84479 ASSAY OF THYROID (T3 OR T4): CPT | Performed by: FAMILY MEDICINE

## 2020-10-16 PROCEDURE — 84443 ASSAY THYROID STIM HORMONE: CPT | Performed by: FAMILY MEDICINE

## 2020-10-16 PROCEDURE — 85025 COMPLETE CBC W/AUTO DIFF WBC: CPT | Performed by: FAMILY MEDICINE

## 2020-10-16 PROCEDURE — 36415 COLL VENOUS BLD VENIPUNCTURE: CPT

## 2020-10-16 PROCEDURE — 84436 ASSAY OF TOTAL THYROXINE: CPT | Performed by: FAMILY MEDICINE

## 2020-10-17 LAB
ALBUMIN SERPL-MCNC: 4.3 G/DL (ref 3.5–5.2)
ALBUMIN/GLOB SERPL: 1.3 G/DL
ALP SERPL-CCNC: 43 U/L (ref 39–117)
ALT SERPL W P-5'-P-CCNC: 16 U/L (ref 1–33)
ANION GAP SERPL CALCULATED.3IONS-SCNC: 7.1 MMOL/L (ref 5–15)
AST SERPL-CCNC: 23 U/L (ref 1–32)
BILIRUB SERPL-MCNC: 0.4 MG/DL (ref 0–1.2)
BUN SERPL-MCNC: 6 MG/DL (ref 6–20)
BUN/CREAT SERPL: 7.1 (ref 7–25)
CALCIUM SPEC-SCNC: 9.6 MG/DL (ref 8.6–10.5)
CHLORIDE SERPL-SCNC: 104 MMOL/L (ref 98–107)
CHOLEST SERPL-MCNC: 200 MG/DL (ref 0–200)
CO2 SERPL-SCNC: 26.9 MMOL/L (ref 22–29)
CREAT SERPL-MCNC: 0.84 MG/DL (ref 0.57–1)
GFR SERPL CREATININE-BSD FRML MDRD: 75 ML/MIN/1.73
GFR SERPL CREATININE-BSD FRML MDRD: 91 ML/MIN/1.73
GLOBULIN UR ELPH-MCNC: 3.3 GM/DL
GLUCOSE SERPL-MCNC: 77 MG/DL (ref 65–99)
HDLC SERPL-MCNC: 48 MG/DL (ref 40–60)
LDLC SERPL CALC-MCNC: 138 MG/DL (ref 0–100)
LDLC/HDLC SERPL: 2.85 {RATIO}
POTASSIUM SERPL-SCNC: 4.5 MMOL/L (ref 3.5–5.2)
PROT SERPL-MCNC: 7.6 G/DL (ref 6–8.5)
SODIUM SERPL-SCNC: 138 MMOL/L (ref 136–145)
T-UPTAKE NFR SERPL: 0.83 TBI (ref 0.8–1.3)
T4 SERPL-MCNC: 9.81 MCG/DL (ref 4.5–11.7)
TRIGL SERPL-MCNC: 76 MG/DL (ref 0–150)
TSH SERPL DL<=0.05 MIU/L-ACNC: 0.98 UIU/ML (ref 0.27–4.2)
VLDLC SERPL-MCNC: 14 MG/DL (ref 5–40)

## 2020-10-21 NOTE — PROGRESS NOTES
Please inform the patient of the following abnormal results.  LDL is elevated, but manage with diet and exercise.

## 2020-11-03 ENCOUNTER — OFFICE VISIT (OUTPATIENT)
Dept: INTERNAL MEDICINE | Facility: CLINIC | Age: 40
End: 2020-11-03

## 2020-11-03 VITALS
BODY MASS INDEX: 24.34 KG/M2 | OXYGEN SATURATION: 99 % | DIASTOLIC BLOOD PRESSURE: 72 MMHG | RESPIRATION RATE: 16 BRPM | WEIGHT: 155.1 LBS | HEART RATE: 89 BPM | TEMPERATURE: 97.3 F | HEIGHT: 67 IN | SYSTOLIC BLOOD PRESSURE: 118 MMHG

## 2020-11-03 DIAGNOSIS — Z13.29 SCREENING FOR THYROID DISORDER: ICD-10-CM

## 2020-11-03 DIAGNOSIS — Z00.00 WELL WOMAN EXAM (NO GYNECOLOGICAL EXAM): Primary | ICD-10-CM

## 2020-11-03 DIAGNOSIS — Z13.1 SCREENING FOR DIABETES MELLITUS: ICD-10-CM

## 2020-11-03 DIAGNOSIS — Z13.220 SCREENING FOR LIPID DISORDERS: ICD-10-CM

## 2020-11-03 DIAGNOSIS — K21.9 GASTROESOPHAGEAL REFLUX DISEASE WITHOUT ESOPHAGITIS: ICD-10-CM

## 2020-11-03 PROCEDURE — 99396 PREV VISIT EST AGE 40-64: CPT | Performed by: FAMILY MEDICINE

## 2020-11-03 RX ORDER — PANTOPRAZOLE SODIUM 20 MG/1
20 TABLET, DELAYED RELEASE ORAL DAILY
Qty: 90 TABLET | Refills: 3 | Status: SHIPPED | OUTPATIENT
Start: 2020-11-03 | End: 2021-02-03

## 2020-11-03 NOTE — PROGRESS NOTES
Subjective   Sarika Zavala is a 40 y.o. female and is here for a comprehensive physical exam. The patient reports no problems.    Pt is UTD with annual gyn exam and mammo.    Patient notes that she has some GERD.  Patient is currently not doing anything for this.  Patient states that she saw dentist, and showed that there was some erosion perhaps in her teeth, which may have indicated some GERD.  Patient is interested on being on something to help with this.          Social History:   Social History     Socioeconomic History   • Marital status: Single     Spouse name: Not on file   • Number of children: Not on file   • Years of education: Not on file   • Highest education level: Not on file   Social Needs   • Financial resource strain: Patient refused   • Food insecurity     Worry: Patient refused     Inability: Patient refused   • Transportation needs     Medical: Patient refused     Non-medical: Patient refused   Tobacco Use   • Smoking status: Never Smoker   • Smokeless tobacco: Former User   Substance and Sexual Activity   • Alcohol use: Yes     Frequency: Monthly or less     Drinks per session: 1 or 2     Binge frequency: Never     Comment: occasion   • Drug use: No   • Sexual activity: Not Currently     Partners: Male   Lifestyle   • Physical activity     Days per week: 5 days     Minutes per session: 60 min   • Stress: Patient refused   Relationships   • Social connections     Talks on phone: Patient refused     Gets together: Patient refused     Attends [employer] service: Patient refused     Active member of club or organization: Patient refused     Attends meetings of clubs or organizations: Patient refused     Relationship status: Patient refused       Family History:   Family History   Problem Relation Age of Onset   • Breast cancer Mother    • No Known Problems Father    • Hypertension Paternal Grandmother        Past Medical History:   Past Medical History:   Diagnosis Date   • Allergic    • Benign  breast cyst in female, right    • Disease of thyroid gland        The following portions of the patient's history were reviewed and updated as appropriate: allergies, current medications, past family history, past medical history, past social history, past surgical history and problem list.    Review of Systems    Review of Systems   Constitutional: Negative for chills and fever.   HENT: Negative for congestion, rhinorrhea, sinus pain and sore throat.    Eyes: Negative for photophobia and visual disturbance.   Respiratory: Negative for cough, chest tightness and shortness of breath.    Cardiovascular: Negative for chest pain and palpitations.   Gastrointestinal: Negative for diarrhea, nausea and vomiting.   Genitourinary: Negative for dysuria, frequency and urgency.   Skin: Negative for rash and wound.   Neurological: Negative for dizziness and syncope.   Psychiatric/Behavioral: Negative for behavioral problems and confusion.       Objective   Physical Exam  Vitals signs and nursing note reviewed.   Constitutional:       Appearance: She is well-developed.   HENT:      Head: Normocephalic and atraumatic.      Right Ear: External ear normal.      Left Ear: External ear normal.   Neck:      Musculoskeletal: Normal range of motion and neck supple.   Cardiovascular:      Rate and Rhythm: Normal rate and regular rhythm.      Heart sounds: Normal heart sounds.   Pulmonary:      Effort: Pulmonary effort is normal. No respiratory distress.      Breath sounds: Normal breath sounds.   Abdominal:      Palpations: Abdomen is soft.      Tenderness: There is no abdominal tenderness. There is no guarding.   Musculoskeletal: Normal range of motion.   Lymphadenopathy:      Cervical: No cervical adenopathy.   Skin:     General: Skin is warm.   Neurological:      Mental Status: She is alert and oriented to person, place, and time.   Psychiatric:         Behavior: Behavior normal.         Vitals:    11/03/20 1348   BP: 118/72   Pulse:  89   Resp: 16   Temp: 97.3 °F (36.3 °C)   SpO2: 99%     Body mass index is 24.29 kg/m².      Medications:   Current Outpatient Medications:   •  Biotin w/ Vitamins C & E (HAIR SKIN & NAILS GUMMIES PO), Take  by mouth. 3 gummies daily, Disp: , Rfl:   •  Calcium Citrate-Vitamin D (CALCIUM + D PO), Take 2 capsules by mouth Daily., Disp: , Rfl:   •  cetirizine (zyrTEC) 5 MG tablet, Take 5 mg by mouth Daily., Disp: , Rfl:   •  Chlorpheniramine Maleate (ALLERGY PO), Take  by mouth. rhinallergy homeopathic medicine, Disp: , Rfl:   •  Pseudoephedrine-Acetaminophen (SINUS PO), Take  by mouth. sinusalia, Disp: , Rfl:   •  QUERCETIN PO, Take 2 capsules by mouth Daily., Disp: , Rfl:   •  Specialty Vitamins Products (THYMUS PO), Take 2 capsules by mouth 2 (Two) Times a Day., Disp: , Rfl:   •  TIROSINT 50 MCG capsule, Take 1 capsule by mouth Daily., Disp: 30 capsule, Rfl: 11  •  Triamcinolone Acetonide (NASACORT) 55 MCG/ACT nasal inhaler, 2 sprays into the nostril(s) as directed by provider Daily., Disp: , Rfl:   •  pantoprazole (Protonix) 20 MG EC tablet, Take 1 tablet by mouth Daily., Disp: 90 tablet, Rfl: 3       Assessment/Plan   Healthy female exam.      1. Healthcare Maintenance:  2. Patient Counseling:  --Nutrition: Stressed importance of moderation in sodium/caffeine intake, saturated fat and cholesterol, caloric balance, sufficient intake of fresh fruits, vegetables, fiber, calcium and vit D  --Exercise: Recommended 30 minutes of exercise daily.  --Immunizations reviewed.      Diagnoses and all orders for this visit:    Well woman exam (no gynecological exam)  -     CBC & Differential  -     Comprehensive Metabolic Panel    Screening for lipid disorders  -     Lipid Panel With LDL / HDL Ratio    Screening for diabetes mellitus  -     Hemoglobin A1c    Screening for thyroid disorder  -     Thyroid Panel With TSH    Gastroesophageal reflux disease without esophagitis  -     pantoprazole (Protonix) 20 MG EC tablet; Take 1  tablet by mouth Daily.  -     Start patient on Protonix 20 mg daily.        No follow-ups on file.             Dictated utilizing Dragon Voice Recognition Software

## 2020-12-23 ENCOUNTER — HOSPITAL ENCOUNTER (OUTPATIENT)
Dept: MAMMOGRAPHY | Facility: HOSPITAL | Age: 40
Discharge: HOME | End: 2020-12-23
Admitting: FAMILY MEDICINE

## 2020-12-23 DIAGNOSIS — Z12.31 VISIT FOR SCREENING MAMMOGRAM: ICD-10-CM

## 2020-12-23 PROCEDURE — 77063 BREAST TOMOSYNTHESIS BI: CPT

## 2020-12-23 PROCEDURE — 77067 SCR MAMMO BI INCL CAD: CPT

## 2020-12-26 DIAGNOSIS — R92.8 ABNORMAL MAMMOGRAM OF RIGHT BREAST: Primary | ICD-10-CM

## 2020-12-28 ENCOUNTER — TELEPHONE (OUTPATIENT)
Dept: INTERNAL MEDICINE | Facility: CLINIC | Age: 40
End: 2020-12-28

## 2021-01-29 ENCOUNTER — HOSPITAL ENCOUNTER (OUTPATIENT)
Dept: ULTRASOUND IMAGING | Facility: HOSPITAL | Age: 41
Discharge: HOME | End: 2021-01-29

## 2021-01-29 ENCOUNTER — HOSPITAL ENCOUNTER (OUTPATIENT)
Dept: MAMMOGRAPHY | Facility: HOSPITAL | Age: 41
Discharge: HOME | End: 2021-01-29

## 2021-01-29 DIAGNOSIS — R92.8 ABNORMAL MAMMOGRAM OF RIGHT BREAST: ICD-10-CM

## 2021-01-29 PROCEDURE — 77065 DX MAMMO INCL CAD UNI: CPT

## 2021-01-29 PROCEDURE — 76642 ULTRASOUND BREAST LIMITED: CPT

## 2021-01-29 PROCEDURE — G0279 TOMOSYNTHESIS, MAMMO: HCPCS

## 2021-02-01 ENCOUNTER — LAB (OUTPATIENT)
Dept: LAB | Facility: HOSPITAL | Age: 41
End: 2021-02-01

## 2021-02-01 ENCOUNTER — TELEPHONE (OUTPATIENT)
Dept: INTERNAL MEDICINE | Facility: CLINIC | Age: 41
End: 2021-02-01

## 2021-02-01 DIAGNOSIS — E03.9 HYPOTHYROIDISM, UNSPECIFIED TYPE: ICD-10-CM

## 2021-02-01 DIAGNOSIS — R92.8 ABNORMAL MAMMOGRAM OF RIGHT BREAST: Primary | ICD-10-CM

## 2021-02-01 DIAGNOSIS — N63.10 BREAST MASS, RIGHT: ICD-10-CM

## 2021-02-01 LAB
T-UPTAKE NFR SERPL: 0.86 TBI (ref 0.8–1.3)
T3FREE SERPL-MCNC: 3.1 PG/ML (ref 2–4.4)
T4 SERPL-MCNC: 8.46 MCG/DL (ref 4.5–11.7)
TSH SERPL DL<=0.05 MIU/L-ACNC: 1.02 UIU/ML (ref 0.27–4.2)

## 2021-02-01 PROCEDURE — 84479 ASSAY OF THYROID (T3 OR T4): CPT | Performed by: FAMILY MEDICINE

## 2021-02-01 PROCEDURE — 36415 COLL VENOUS BLD VENIPUNCTURE: CPT | Performed by: FAMILY MEDICINE

## 2021-02-01 PROCEDURE — 84481 FREE ASSAY (FT-3): CPT

## 2021-02-01 PROCEDURE — 84443 ASSAY THYROID STIM HORMONE: CPT | Performed by: FAMILY MEDICINE

## 2021-02-01 PROCEDURE — 84436 ASSAY OF TOTAL THYROXINE: CPT | Performed by: FAMILY MEDICINE

## 2021-02-01 NOTE — TELEPHONE ENCOUNTER
I wanted to discuss with her in detail about the levothyroxine.  It is not true that levothyroxine can cause breast cancer, we should be careful of things to be read online.  She generally has something wrong with her thyroid, so she is on supplemental medication to improve this, and that is why she takes it Tirosint.  We chose a Tirosint versus regular levothyroxine or Synthroid, as she was looking for the most easiest natural compound for her body.  But I would like to discuss this with her on the phone visit later this week perhaps Thursday morning.    As for her breast, it is concerning and she would benefit from a breast biopsy.  The ultrasound mammogram did save suspicious.  I would feel better if she gets one done, and can have it evaluated.  She is at an age where these things can come up and can be pretty significant.  And the only way to really make sure that is not a cancer is to follow the appropriate screening which she has done, but if a suspicious, it does need to be further evaluated.  Keep the order in, and I can discuss this with her as well if we can set her up for an office visit on the telephone with Thursday morning.  
PT IS REQUESTING A CALL BACK TO DISCUSS THE TEST SHE HAD ON HER R BREAST TISSUE AND DISCUSS HER THYROID MEDICATION.   
Pt contacted and scheduled 2/4/21 9am  
Pt states that she was told by the radiologist that the mass is not concerning for cancer so it is up to the pt whether she wants the biopsy or not.  She is thinking she does not want the biopsy.  She also reports she is on Tirosint and it is not working and she read that levothyroxine can cause breast cancer and since she's been on this medication now she has issues with the breast tissue.
- - -

## 2021-02-03 RX ORDER — OMEPRAZOLE 40 MG/1
40 CAPSULE, DELAYED RELEASE ORAL DAILY
Qty: 90 CAPSULE | Refills: 1 | Status: SHIPPED | OUTPATIENT
Start: 2021-02-03

## 2021-02-03 NOTE — TELEPHONE ENCOUNTER
INSURANCE WILL ONLY COVER 90 PILLS IN A 365 PERIOD, LIMIT MET. RECOMMENDED OMEPRAZOLE, PLEASE ADVISE.

## 2021-02-04 ENCOUNTER — OFFICE VISIT (OUTPATIENT)
Dept: INTERNAL MEDICINE | Facility: CLINIC | Age: 41
End: 2021-02-04

## 2021-02-04 DIAGNOSIS — R92.8 ABNORMAL MAMMOGRAM OF RIGHT BREAST: ICD-10-CM

## 2021-02-04 DIAGNOSIS — N63.10 BREAST MASS, RIGHT: ICD-10-CM

## 2021-02-04 DIAGNOSIS — E03.9 HYPOTHYROIDISM, UNSPECIFIED TYPE: Primary | ICD-10-CM

## 2021-02-04 PROCEDURE — 99214 OFFICE O/P EST MOD 30 MIN: CPT | Performed by: FAMILY MEDICINE

## 2021-02-04 NOTE — PROGRESS NOTES
Please inform the patient of the following abnormal results.  Abnormal and will need to order a core biopsy. Will need to discuss with patient on telephone visit.

## 2021-02-04 NOTE — PROGRESS NOTES
"Chief Complaint  No chief complaint on file.     This visit has been rescheduled as a phone visit to comply with patient safety concerns in accordance with CDC recommendations. Total time of discussion was 30 minutes.    You have chosen to receive care through a telephone visit. Do you consent to use a telephone visit for your medical care today? Yes      Subjective          Sarika Zavala presents to [facility] for   History of Present Illness    Patient with past medical history of having hypothyroidism.  Her most recent thyroid panel is within normal limits.  She is currently taking Tirosint 50 mcg daily.  She denies any side effects of the medication.    Patient recently had a mammogram which showed \" Developing asymmetry in the outer posterior right breast with a  possible correlate in the central to slightly lower posterior breast on the lateral view. This is best appreciated on the CC view. Further evaluation with stereotactic/Tomosynthesis guided core needle biopsy is recommended.\"  I did discuss these findings with the patient and I discussed with her that it would benefit from her having a biopsy done.    Objective   Vital Signs:   There were no vitals taken for this visit.    Physical Exam  Vitals signs and nursing note reviewed.   Constitutional:       Appearance: She is well-developed.   HENT:      Head: Normocephalic and atraumatic.   Neurological:      Mental Status: She is alert and oriented to person, place, and time.   Psychiatric:         Behavior: Behavior normal.        Result Review :                 Assessment and Plan    Problem List Items Addressed This Visit        Endocrine and Metabolic    Hypothyroidism - Primary    Relevant Orders    Thyroid Panel With TSH      Other Visit Diagnoses     Breast mass, right        Abnormal mammogram of right breast            Discussed this with patient, that she would benefit from having a biopsy done, to rule " out breast cancer.  Patient was initially worried by the cost, and then she is worried that her levothyroxine may lead to this.  I reassured her.  I discussed that nothing is finalized until we have the biopsy to rule anything out.  Patient understood and agreed with plan, consent to go forward.  Her hypothyroidism is currently stable, she should continue to worsen.      Follow Up   No follow-ups on file.  Patient was given instructions and counseling regarding her condition or for health maintenance advice. Please see specific information pulled into the AVS if appropriate.

## 2021-02-11 ENCOUNTER — HOSPITAL ENCOUNTER (OUTPATIENT)
Dept: MAMMOGRAPHY | Facility: HOSPITAL | Age: 41
Discharge: HOME | End: 2021-02-11

## 2021-02-26 ENCOUNTER — HOSPITAL ENCOUNTER (OUTPATIENT)
Dept: MAMMOGRAPHY | Facility: HOSPITAL | Age: 41
Discharge: HOME | End: 2021-02-26
Admitting: FAMILY MEDICINE

## 2021-02-26 VITALS
DIASTOLIC BLOOD PRESSURE: 71 MMHG | HEART RATE: 79 BPM | WEIGHT: 150 LBS | BODY MASS INDEX: 23.54 KG/M2 | SYSTOLIC BLOOD PRESSURE: 112 MMHG | TEMPERATURE: 98 F | HEIGHT: 67 IN | OXYGEN SATURATION: 100 % | RESPIRATION RATE: 16 BRPM

## 2021-02-26 DIAGNOSIS — R92.8 ABNORMAL MAMMOGRAM OF RIGHT BREAST: ICD-10-CM

## 2021-02-26 DIAGNOSIS — N63.10 BREAST MASS, RIGHT: ICD-10-CM

## 2021-02-26 PROCEDURE — 25010000003 LIDOCAINE 1 % SOLUTION: Performed by: FAMILY MEDICINE

## 2021-02-26 PROCEDURE — 88305 TISSUE EXAM BY PATHOLOGIST: CPT | Performed by: FAMILY MEDICINE

## 2021-02-26 RX ORDER — LIDOCAINE HYDROCHLORIDE 10 MG/ML
1 INJECTION, SOLUTION INFILTRATION; PERINEURAL ONCE
Status: COMPLETED | OUTPATIENT
Start: 2021-02-26 | End: 2021-02-26

## 2021-02-26 RX ADMIN — LIDOCAINE HYDROCHLORIDE 10 ML: 10; .005 INJECTION, SOLUTION EPIDURAL; INFILTRATION; INTRACAUDAL; PERINEURAL at 12:46

## 2021-02-26 RX ADMIN — LIDOCAINE HYDROCHLORIDE 1 ML: 10 INJECTION, SOLUTION INFILTRATION; PERINEURAL at 12:45

## 2021-02-26 NOTE — NURSING NOTE
Biopsy site to right outer breast clear with Dermabond dry and intact. No firmness or swelling noted at or around biopsy site. Denies pain. Wide Ace pressure wrap applied in lieu of bra. Ice pack with protective covering applied to biopsy site. Discharge instructions discussed with understanding voiced by patient. Copies provided to patient. No distress noted. To home via private vehicle.

## 2021-02-26 NOTE — H&P
Name: Sarika Zavala ADMIT: 2021   : 1980  PCP: Smooth Parham MD    MRN: [id number] LOS: 0 days   AGE/SEX: 41 y.o. female  ROOM: Room/bed info not found       Chief complaint R breast developing asymmetry    Present Illness or Internal History:  Patient is a 41 y.o. female presents with R breast developing asymmetry.     Past Surgical History:  History reviewed. No pertinent surgical history.    Past Medical History:  Past Medical History:   Diagnosis Date   • Allergic    • Benign breast cyst in female, right    • Disease of thyroid gland        Home Medications:  (Not in a hospital admission)      Allergies:  Penicillins    Family History:  Family History   Problem Relation Age of Onset   • Breast cancer Mother    • No Known Problems Father    • Hypertension Paternal Grandmother        Social History:  Social History     Tobacco Use   • Smoking status: Never Smoker   • Smokeless tobacco: Former User   Substance Use Topics   • Alcohol use: Yes     Frequency: Monthly or less     Drinks per session: 1 or 2     Binge frequency: Never     Comment: occasion   • Drug use: No        Objective     Physical Exam:    No exam performed today,    Vital Signs  Temp:  [98 °F (36.7 °C)] 98 °F (36.7 °C)  Heart Rate:  [88] 88  Resp:  [16] 16  BP: (113)/(71) 113/71    Anticipated Surgical Procedure:  Right breast ultrasound guided core needle biopsy    The risks, benefits and alternatives of this procedure have been discussed with the patient or responsible party: Yes        Saige Marie MD  21  12:19 EST

## 2021-02-27 ENCOUNTER — TELEPHONE (OUTPATIENT)
Dept: INTERVENTIONAL RADIOLOGY/VASCULAR | Facility: HOSPITAL | Age: 41
End: 2021-02-27

## 2021-02-27 NOTE — TELEPHONE ENCOUNTER
Call to check on patient post biopsy yesterday went to voicemail and msg left to call if any problems or concerns.

## 2021-03-01 LAB
LAB AP CASE REPORT: NORMAL
PATH REPORT.FINAL DX SPEC: NORMAL
PATH REPORT.GROSS SPEC: NORMAL

## 2021-03-03 ENCOUNTER — TELEPHONE (OUTPATIENT)
Dept: INTERNAL MEDICINE | Facility: CLINIC | Age: 41
End: 2021-03-03

## 2021-03-03 DIAGNOSIS — N63.10 BREAST MASS, RIGHT: Primary | ICD-10-CM

## 2021-03-03 NOTE — TELEPHONE ENCOUNTER
Pt called for her results of pathology from core biopsy.  Dr. Parham and I went over results and gave them to pt, she is aware to follow up with mammogram and US in 6 months.

## 2021-03-09 ENCOUNTER — TRANSCRIBE ORDERS (OUTPATIENT)
Dept: ADMINISTRATIVE | Facility: HOSPITAL | Age: 41
End: 2021-03-09

## 2021-03-09 DIAGNOSIS — Z12.31 SCREENING MAMMOGRAM, ENCOUNTER FOR: Primary | ICD-10-CM

## 2021-03-10 DIAGNOSIS — N63.10 BREAST MASS, RIGHT: Primary | ICD-10-CM

## 2021-04-13 ENCOUNTER — OFFICE VISIT (OUTPATIENT)
Dept: OBSTETRICS AND GYNECOLOGY | Age: 41
End: 2021-04-13

## 2021-04-13 VITALS
WEIGHT: 151.4 LBS | BODY MASS INDEX: 23.76 KG/M2 | DIASTOLIC BLOOD PRESSURE: 68 MMHG | HEIGHT: 67 IN | SYSTOLIC BLOOD PRESSURE: 104 MMHG

## 2021-04-13 DIAGNOSIS — Z01.419 ENCOUNTER FOR GYNECOLOGICAL EXAMINATION WITHOUT ABNORMAL FINDING: Primary | ICD-10-CM

## 2021-04-13 PROCEDURE — 99396 PREV VISIT EST AGE 40-64: CPT | Performed by: OBSTETRICS & GYNECOLOGY

## 2021-04-13 NOTE — PROGRESS NOTES
Routine Annual Visit    2021    Patient: Sarika Zavala          MR#:[id number]      Chief Complaint   Patient presents with   • Gynecologic Exam     AE Today, Last AE 2020 (-), HPV (-), MG 2021. Pt does notice that her right breast is larger then her other one.        History of Present Illness    41 y.o. female  who presents for annual exam.   Had breast bx this year and was benign, needs repeat US in July  Her mother had breast ca in her 50's. Had considered genetic testiing in the past but was cost prohibitive.  Complains of some weight  Gain    Menses are regular and monthly      Patient's last menstrual period was 2021 (exact date).  Obstetric History:  OB History        0    Para   0    Term   0       0    AB   0    Living   0       SAB   0    TAB   0    Ectopic   0    Molar   0    Multiple   0    Live Births   0               Menstrual History:     Patient's last menstrual period was 2021 (exact date).       ________________________________________  Patient Active Problem List   Diagnosis   • Hypothyroidism       Past Medical History:   Diagnosis Date   • Allergic    • Benign breast cyst in female, right    • Disease of thyroid gland        Family History   Problem Relation Age of Onset   • Breast cancer Mother    • No Known Problems Father    • Hypertension Paternal Grandmother        History reviewed. No pertinent surgical history.    Social History     Tobacco Use   Smoking Status Never Smoker   Smokeless Tobacco Former User       has a current medication list which includes the following prescription(s): biotin w/ vitamins c & e, calcium citrate-vitamin d, cetirizine, quercetin, tirosint, triamcinolone acetonide, chlorpheniramine maleate, omeprazole, pseudoephedrine-acetaminophen, and specialty vitamins products.  ________________________________________      Review of Systems   Constitutional: Negative for fever and unexpected weight change.   Respiratory:  "Negative for shortness of breath.    Cardiovascular: Negative for chest pain.   Gastrointestinal: Negative for abdominal pain, constipation and diarrhea.   Genitourinary: Negative for frequency and urgency.   Hematological: Negative for adenopathy.   Psychiatric/Behavioral: Negative for dysphoric mood.       Objective   Physical Exam    /68   Ht 170.2 cm (67.01\")   Wt 68.7 kg (151 lb 6.4 oz)   LMP 04/12/2021 (Exact Date)   Breastfeeding No   BMI 23.71 kg/m²    BP Readings from Last 3 Encounters:   04/13/21 104/68   02/26/21 112/71   11/03/20 118/72      Wt Readings from Last 3 Encounters:   04/13/21 68.7 kg (151 lb 6.4 oz)   02/26/21 68 kg (150 lb)   11/03/20 70.4 kg (155 lb 1.6 oz)         BMI: Body mass index is 23.71 kg/m².       General:   alert, appears stated age and cooperative   Neck: No thyromegaly or LAD, no carotid bruit noted   Heart:: regular rate and rhythm, S1, S2 normal, no murmur, click, rub or gallop   Lungs: normal respiratory effort and auscultation   Abdomen: soft, non-tender, without masses or organomegaly   Breast: inspection negative, no nipple discharge or bleeding, no masses or nodularity palpable and bx site present on right lateral breast   Urethra and bladder: urethral meatus normal; bladder nontender to palpation;   Vulva: normal, Bartholin's, Urethra, Sargeant's normal   Vagina: normal mucosa, normal discharge   Cervix: no lesions and nulliparous appearance   Uterus: normal size, mobile, non-tender or retroverted   Adnexa: normal adnexa       Assessment:    normal annual exam   Famhx breast cancer    Plan:    Plan     []  Mammogram request made up-to-date  []  PAP done up-to-date  []  Labs:   []  GC/Chl/TV  []  DEXA scan   []  Referral for colonoscopy:     I discussed genetic testing today, she will consider.  Could also consider testing for her mother who is also a patient.    Counseling  [x]  Nutrition  [x]  Physical activity/regular exercise   [x]  Healthy weight  []  Injury " prevention  []  Smoking cessation  []  Substance misuse/abuse  [x]  Sexual behavior  []  STD prevention  []  Contraception  []  Dental health  []  Mental health  [x]  Immunization  []  Encouraged SBE        Angelita Villafana MD  04/13/2021  08:20 EDT

## 2021-04-14 DIAGNOSIS — E03.9 HYPOTHYROIDISM, UNSPECIFIED TYPE: ICD-10-CM

## 2021-04-15 RX ORDER — LEVOTHYROXINE SODIUM 50 UG/1
CAPSULE ORAL
Qty: 90 CAPSULE | Refills: 1 | Status: SHIPPED | OUTPATIENT
Start: 2021-04-15

## 2022-02-16 ENCOUNTER — TRANSCRIBE ORDERS (OUTPATIENT)
Dept: ADMINISTRATIVE | Facility: HOSPITAL | Age: 42
End: 2022-02-16

## 2022-02-16 DIAGNOSIS — Z12.31 SCREENING MAMMOGRAM FOR HIGH-RISK PATIENT: Primary | ICD-10-CM

## 2022-04-07 ENCOUNTER — HOSPITAL ENCOUNTER (OUTPATIENT)
Dept: MAMMOGRAPHY | Facility: HOSPITAL | Age: 42
Discharge: HOME | End: 2022-04-07
Admitting: FAMILY MEDICINE

## 2022-04-07 DIAGNOSIS — Z12.31 SCREENING MAMMOGRAM FOR HIGH-RISK PATIENT: ICD-10-CM

## 2022-04-07 PROCEDURE — 77067 SCR MAMMO BI INCL CAD: CPT

## 2022-04-07 PROCEDURE — 77063 BREAST TOMOSYNTHESIS BI: CPT

## 2022-04-11 NOTE — PROGRESS NOTES
IMPRESSION/RECOMMENDATION(S):  No mammographic evidence of malignancy. Recommend annual screening  mammogram in one year.    BI-RADS Category 2: Benign

## 2023-02-07 ENCOUNTER — TRANSCRIBE ORDERS (OUTPATIENT)
Dept: ADMINISTRATIVE | Facility: HOSPITAL | Age: 43
End: 2023-02-07
Payer: COMMERCIAL

## 2023-02-07 DIAGNOSIS — Z12.31 VISIT FOR SCREENING MAMMOGRAM: Primary | ICD-10-CM

## 2023-05-05 ENCOUNTER — HOSPITAL ENCOUNTER (OUTPATIENT)
Dept: MAMMOGRAPHY | Facility: HOSPITAL | Age: 43
Discharge: HOME | End: 2023-05-05
Admitting: NURSE PRACTITIONER
Payer: COMMERCIAL

## 2023-05-05 DIAGNOSIS — Z12.31 VISIT FOR SCREENING MAMMOGRAM: ICD-10-CM

## 2023-05-05 PROCEDURE — 77063 BREAST TOMOSYNTHESIS BI: CPT

## 2023-05-05 PROCEDURE — 77067 SCR MAMMO BI INCL CAD: CPT

## 2024-03-11 ENCOUNTER — TRANSCRIBE ORDERS (OUTPATIENT)
Dept: ADMINISTRATIVE | Facility: HOSPITAL | Age: 44
End: 2024-03-11
Payer: COMMERCIAL

## 2024-03-11 DIAGNOSIS — Z12.31 VISIT FOR SCREENING MAMMOGRAM: Primary | ICD-10-CM

## 2024-05-30 ENCOUNTER — HOSPITAL ENCOUNTER (OUTPATIENT)
Dept: MAMMOGRAPHY | Facility: HOSPITAL | Age: 44
Discharge: HOME | End: 2024-05-30
Admitting: NURSE PRACTITIONER
Payer: COMMERCIAL

## 2024-05-30 DIAGNOSIS — Z12.31 VISIT FOR SCREENING MAMMOGRAM: ICD-10-CM

## 2024-05-30 PROCEDURE — 77063 BREAST TOMOSYNTHESIS BI: CPT

## 2024-05-30 PROCEDURE — 77067 SCR MAMMO BI INCL CAD: CPT

## 2025-04-02 ENCOUNTER — TRANSCRIBE ORDERS (OUTPATIENT)
Dept: ADMINISTRATIVE | Facility: HOSPITAL | Age: 45
End: 2025-04-02
Payer: COMMERCIAL

## 2025-04-02 DIAGNOSIS — Z12.31 SCREENING MAMMOGRAM, ENCOUNTER FOR: Primary | ICD-10-CM

## 2025-06-03 ENCOUNTER — HOSPITAL ENCOUNTER (OUTPATIENT)
Dept: MAMMOGRAPHY | Facility: HOSPITAL | Age: 45
Discharge: HOME | End: 2025-06-03
Admitting: NURSE PRACTITIONER
Payer: COMMERCIAL

## 2025-06-03 DIAGNOSIS — Z12.31 SCREENING MAMMOGRAM, ENCOUNTER FOR: ICD-10-CM

## 2025-06-03 PROCEDURE — 77063 BREAST TOMOSYNTHESIS BI: CPT

## 2025-06-03 PROCEDURE — 77067 SCR MAMMO BI INCL CAD: CPT
